# Patient Record
Sex: MALE | Race: OTHER | Employment: FULL TIME | URBAN - METROPOLITAN AREA
[De-identification: names, ages, dates, MRNs, and addresses within clinical notes are randomized per-mention and may not be internally consistent; named-entity substitution may affect disease eponyms.]

---

## 2020-04-16 ENCOUNTER — APPOINTMENT (OUTPATIENT)
Dept: GENERAL RADIOLOGY | Age: 30
End: 2020-04-16
Attending: EMERGENCY MEDICINE
Payer: SELF-PAY

## 2020-04-16 ENCOUNTER — HOSPITAL ENCOUNTER (EMERGENCY)
Age: 30
Discharge: HOME | End: 2020-04-16
Attending: EMERGENCY MEDICINE
Payer: SELF-PAY

## 2020-04-16 VITALS
DIASTOLIC BLOOD PRESSURE: 56 MMHG | WEIGHT: 230 LBS | HEIGHT: 66 IN | TEMPERATURE: 98.3 F | BODY MASS INDEX: 36.96 KG/M2 | HEART RATE: 88 BPM | SYSTOLIC BLOOD PRESSURE: 116 MMHG | OXYGEN SATURATION: 99 % | RESPIRATION RATE: 12 BRPM

## 2020-04-16 DIAGNOSIS — K29.90 GASTRITIS AND DUODENITIS: ICD-10-CM

## 2020-04-16 DIAGNOSIS — R10.13 ABDOMINAL PAIN, EPIGASTRIC: Primary | ICD-10-CM

## 2020-04-16 LAB
ALBUMIN SERPL-MCNC: 3.9 G/DL (ref 3.5–5)
ALBUMIN/GLOB SERPL: 0.9 {RATIO} (ref 1.2–3.5)
ALP SERPL-CCNC: 59 U/L (ref 50–136)
ALT SERPL-CCNC: 19 U/L (ref 12–65)
ANION GAP SERPL CALC-SCNC: 7 MMOL/L (ref 7–16)
AST SERPL-CCNC: 16 U/L (ref 15–37)
BILIRUB SERPL-MCNC: 0.6 MG/DL (ref 0.2–1.1)
BUN SERPL-MCNC: 8 MG/DL (ref 6–23)
CALCIUM SERPL-MCNC: 9.3 MG/DL (ref 8.3–10.4)
CHLORIDE SERPL-SCNC: 102 MMOL/L (ref 98–107)
CO2 SERPL-SCNC: 26 MMOL/L (ref 21–32)
CREAT SERPL-MCNC: 0.95 MG/DL (ref 0.8–1.5)
ERYTHROCYTE [DISTWIDTH] IN BLOOD BY AUTOMATED COUNT: 13 % (ref 11.9–14.6)
GLOBULIN SER CALC-MCNC: 4.4 G/DL (ref 2.3–3.5)
GLUCOSE SERPL-MCNC: 96 MG/DL (ref 65–100)
HCT VFR BLD AUTO: 47.5 % (ref 41.1–50.3)
HGB BLD-MCNC: 16.2 G/DL (ref 13.6–17.2)
LIPASE SERPL-CCNC: 68 U/L (ref 73–393)
MCH RBC QN AUTO: 29.9 PG (ref 26.1–32.9)
MCHC RBC AUTO-ENTMCNC: 34.1 G/DL (ref 31.4–35)
MCV RBC AUTO: 87.8 FL (ref 79.6–97.8)
NRBC # BLD: 0 K/UL (ref 0–0.2)
PLATELET # BLD AUTO: 299 K/UL (ref 150–450)
PMV BLD AUTO: 9.8 FL (ref 9.4–12.3)
POTASSIUM SERPL-SCNC: 4.1 MMOL/L (ref 3.5–5.1)
PROT SERPL-MCNC: 8.3 G/DL (ref 6.3–8.2)
RBC # BLD AUTO: 5.41 M/UL (ref 4.23–5.6)
SODIUM SERPL-SCNC: 135 MMOL/L (ref 136–145)
WBC # BLD AUTO: 5.7 K/UL (ref 4.3–11.1)

## 2020-04-16 PROCEDURE — 71045 X-RAY EXAM CHEST 1 VIEW: CPT

## 2020-04-16 PROCEDURE — 83690 ASSAY OF LIPASE: CPT

## 2020-04-16 PROCEDURE — 74011250637 HC RX REV CODE- 250/637: Performed by: EMERGENCY MEDICINE

## 2020-04-16 PROCEDURE — 80053 COMPREHEN METABOLIC PANEL: CPT

## 2020-04-16 PROCEDURE — 85027 COMPLETE CBC AUTOMATED: CPT

## 2020-04-16 PROCEDURE — 99283 EMERGENCY DEPT VISIT LOW MDM: CPT

## 2020-04-16 RX ORDER — RANITIDINE 150 MG/1
150 TABLET, FILM COATED ORAL 2 TIMES DAILY
Qty: 20 TAB | Refills: 0 | Status: SHIPPED | OUTPATIENT
Start: 2020-04-16 | End: 2020-04-26

## 2020-04-16 RX ORDER — SUCRALFATE 1 G/1
1 TABLET ORAL ONCE
Status: COMPLETED | OUTPATIENT
Start: 2020-04-16 | End: 2020-04-16

## 2020-04-16 RX ORDER — PHENOL/SODIUM PHENOLATE
20 AEROSOL, SPRAY (ML) MUCOUS MEMBRANE DAILY
Qty: 30 TAB | Refills: 2 | Status: SHIPPED | OUTPATIENT
Start: 2020-04-16

## 2020-04-16 RX ADMIN — SUCRALFATE 1 G: 1 TABLET ORAL at 13:04

## 2020-04-16 NOTE — PROGRESS NOTES
Hospital Interpreting services available      Interpreting services  offered to assist with any requests in the Emergency Department.  available through video [email or web address]) or over the phone [phone].       Thank you,      Julissa Del Valle,   [facility]  [address]  [phone] (phone)

## 2020-04-16 NOTE — ED NOTES
I have reviewed discharge instructions with the patient. The patient verbalized understanding. Patient left ED via Discharge Method: ambulatory to Home with self. Opportunity for questions and clarification provided. Patient given 2 scripts. To continue your aftercare when you leave the hospital, you may receive an automated call from our care team to check in on how you are doing. This is a free service and part of our promise to provide the best care and service to meet your aftercare needs.  If you have questions, or wish to unsubscribe from this service please call [phone]. Thank you for Choosing our [facility] Emergency Department.

## 2020-04-16 NOTE — ED TRIAGE NOTES
Pt states abd pain for a few weeks. Was dx in [place] with gastritis. States he has been vomiting.

## 2020-04-16 NOTE — ED PROVIDER NOTES
[facility] Emergency Department  Arrival Date/Time: 4/16/2020 @ 5596      Jayy Denis  MRN: [id number]      27 y.o. male    YOB: 1990   There are no phone numbers on file. [facility] EMERGENCY DEPT ER08/08  Seen on 4/16/2020 @ 12:51 PM      Today's Chief Complaint: No chief complaint on file. HPI: 59-year-old male presents to the emergency department with 2 weeks of epigastric pain. No alleviating. Worse at night. Associated with some vomiting    He had a similar episode 6 months to a year ago while he was in [place]. States he went to the hospital and was told he had gastritis. Was given a prescription but has not been able to get any medicine because of money    No fever. No diarrhea. He works as a cook at [employer]. HPI    Review of Systems: Review of Systems   Constitutional: Negative for activity change, appetite change, chills and fever. HENT: Negative. Respiratory: Negative. Cardiovascular: Negative. Gastrointestinal: Positive for abdominal pain, nausea and vomiting. Genitourinary: Negative. Musculoskeletal: Negative. Neurological: Negative. Psychiatric/Behavioral: Negative. Past Medical History: Primary Care Doctor: No primary care provider on file. Meds, PMH, PSHx, SocHx at end of this note     Allergies: Allergies not on file      Key Anti-Platelet Anticoagulant Meds     The patient is on no antiplatelet meds or anticoagulants. Physical Exam:  Nursing documentation reviewed. No data found. Vital signs were reviewed. Physical Exam  Vitals signs and nursing note reviewed. Constitutional:       General: He is not in acute distress. Appearance: He is well-developed. He is ill-appearing. He is not toxic-appearing. HENT:      Head: Normocephalic and atraumatic. Mouth/Throat:      Mouth: Mucous membranes are moist.   Cardiovascular:      Rate and Rhythm: Normal rate and regular rhythm.       Heart sounds: Normal heart sounds. Pulmonary:      Effort: Pulmonary effort is normal. No respiratory distress. Breath sounds: Normal breath sounds. No stridor. Abdominal:      Palpations: Abdomen is soft. Tenderness: There is abdominal tenderness in the epigastric area. There is no guarding or rebound. Skin:     General: Skin is warm and dry. Capillary Refill: Capillary refill takes less than 2 seconds. Neurological:      Mental Status: He is alert and oriented to person, place, and time. MEDICAL DECISION MAKING:   Differential Diagnosis:    MDM  Number of Diagnoses or Management Options  Abdominal pain, epigastric:   Gastritis and duodenitis:   Diagnosis management comments: Well-appearing 35-year-old male with epigastric pain associated with nausea. Occasional vomiting    No fever no chills. Drinks occasionally. He vapes.   No drugs           Amount and/or Complexity of Data Reviewed  Clinical lab tests: ordered    Risk of Complications, Morbidity, and/or Mortality  Presenting problems: moderate  Diagnostic procedures: minimal  Management options: moderate          Data/Management:    Lab findings during this visit:   Recent Results (from the past 48 hour(s))   CBC W/O DIFF    Collection Time: 04/16/20  1:09 PM   Result Value Ref Range    WBC 5.7 4.3 - 11.1 K/uL    RBC 5.41 4.23 - 5.6 M/uL    HGB 16.2 13.6 - 17.2 g/dL    HCT 47.5 41.1 - 50.3 %    MCV 87.8 79.6 - 97.8 FL    MCH 29.9 26.1 - 32.9 PG    MCHC 34.1 31.4 - 35.0 g/dL    RDW 13.0 11.9 - 14.6 %    PLATELET 728 632 - 280 K/uL    MPV 9.8 9.4 - 12.3 FL    ABSOLUTE NRBC 0.00 0.0 - 0.2 K/uL   METABOLIC PANEL, COMPREHENSIVE    Collection Time: 04/16/20  1:09 PM   Result Value Ref Range    Sodium 135 (L) 136 - 145 mmol/L    Potassium 4.1 3.5 - 5.1 mmol/L    Chloride 102 98 - 107 mmol/L    CO2 26 21 - 32 mmol/L    Anion gap 7 7 - 16 mmol/L    Glucose 96 65 - 100 mg/dL    BUN 8 6 - 23 MG/DL    Creatinine 0.95 0.8 - 1.5 MG/DL    GFR est AA >60 >60 ml/min/1.73m2    GFR est non-AA >60 >60 ml/min/1.73m2    Calcium 9.3 8.3 - 10.4 MG/DL    Bilirubin, total 0.6 0.2 - 1.1 MG/DL    ALT (SGPT) 19 12 - 65 U/L    AST (SGOT) 16 15 - 37 U/L    Alk. phosphatase 59 50 - 136 U/L    Protein, total 8.3 (H) 6.3 - 8.2 g/dL    Albumin 3.9 3.5 - 5.0 g/dL    Globulin 4.4 (H) 2.3 - 3.5 g/dL    A-G Ratio 0.9 (L) 1.2 - 3.5     LIPASE    Collection Time: 04/16/20  1:09 PM   Result Value Ref Range    Lipase 68 (L) 73 - 393 U/L     Radiology studies during this visit: Xr Chest Port    Result Date: 4/16/2020  IMPRESSION: 1. No acute findings. Medications given in the ED:   Medications   sucralfate (CARAFATE) tablet 1 g (1 g Oral Given 4/16/20 1304)       Recheck and Additional Documentation:  (use .addrecheck  . addsepsis   . addstroke   . addhip  . addhandoff  . addcctime)     Labs reviewed. He is resting comfortably. Appears well and nontoxic. Procedure Documentation:   Procedures     Other ED Course Notes:     ED Course as of Apr 17 0725   Thu Apr 16, 2020   1325 No free air. No infiltrate   XR CHEST PORT [GH]      ED Course User Index  [GH] Kwasi Lion MD       I wore appropriate PPE throughout this patient's ED encounter.        Assessment and Plan:      Problem List Items Addressed This Visit     None      Visit Diagnoses     Abdominal pain, epigastric    -  Primary    Gastritis and duodenitis        Relevant Medications    raNITIdine (Zantac) 150 mg tablet    Omeprazole delayed release (PRILOSEC D/R) 20 mg tablet        Disposition:  home  Follow-up:   Follow-up Information     Follow up With Specialties Details Why [facility] EMERGENCY DEPT Emergency Medicine  Come back to the ED if you get worse or develop any new problems [address]    [address] [place], [place] [id number] [place] [address] Road:   Discharge Medication List as of 4/16/2020  3:53 PM      START taking these medications    Details   raNITIdine (Zantac) 150 mg tablet Take 1 Tab by mouth two (2) times a day for 10 days. , Print, Disp-20 Tab, R-0      Omeprazole delayed release (PRILOSEC D/R) 20 mg tablet Take 1 Tab by mouth daily. , Print, Disp-30 Tab, R-2             Past Medical History:    No past medical history on file. No past surgical history on file.   Social History     Tobacco Use    Smoking status: Not on file   Substance Use Topics    Alcohol use: Not on file    Drug use: Not on file     None

## 2020-04-16 NOTE — DISCHARGE INSTRUCTIONS
Recent Results (from the past 8 hour(s))   CBC W/O DIFF    Collection Time: 04/16/20  1:09 PM   Result Value Ref Range    WBC 5.7 4.3 - 11.1 K/uL    RBC 5.41 4.23 - 5.6 M/uL    HGB 16.2 13.6 - 17.2 g/dL    HCT 47.5 41.1 - 50.3 %    MCV 87.8 79.6 - 97.8 FL    MCH 29.9 26.1 - 32.9 PG    MCHC 34.1 31.4 - 35.0 g/dL    RDW 13.0 11.9 - 14.6 %    PLATELET 428 441 - 647 K/uL    MPV 9.8 9.4 - 12.3 FL    ABSOLUTE NRBC 0.00 0.0 - 0.2 K/uL   METABOLIC PANEL, COMPREHENSIVE    Collection Time: 04/16/20  1:09 PM   Result Value Ref Range    Sodium 135 (L) 136 - 145 mmol/L    Potassium 4.1 3.5 - 5.1 mmol/L    Chloride 102 98 - 107 mmol/L    CO2 26 21 - 32 mmol/L    Anion gap 7 7 - 16 mmol/L    Glucose 96 65 - 100 mg/dL    BUN 8 6 - 23 MG/DL    Creatinine 0.95 0.8 - 1.5 MG/DL    GFR est AA >60 >60 ml/min/1.73m2    GFR est non-AA >60 >60 ml/min/1.73m2    Calcium 9.3 8.3 - 10.4 MG/DL    Bilirubin, total 0.6 0.2 - 1.1 MG/DL    ALT (SGPT) 19 12 - 65 U/L    AST (SGOT) 16 15 - 37 U/L    Alk. phosphatase 59 50 - 136 U/L    Protein, total 8.3 (H) 6.3 - 8.2 g/dL    Albumin 3.9 3.5 - 5.0 g/dL    Globulin 4.4 (H) 2.3 - 3.5 g/dL    A-G Ratio 0.9 (L) 1.2 - 3.5     LIPASE    Collection Time: 04/16/20  1:09 PM   Result Value Ref Range    Lipase 68 (L) 73 - 393 U/L     Xr Chest Port    Result Date: 4/16/2020  CHEST X-RAY, single portable view  4/16/2020 History: Abdominal pain for a few weeks. Prior diagnosis of gastritis. Vomiting. Technique: Single frontal view of the chest. Comparison: None. Findings: The lungs are mildly hypoaerated. The cardiac silhouette is normal in respect to size. The lungs are expanded without evidence for pneumothorax. No consolidative airspace process or pleural effusion is seen. No free air is seen in the partially visualized abdomen. No abnormal bowel dilation is seen. IMPRESSION: 1. No acute findings.